# Patient Record
Sex: FEMALE | Race: OTHER
[De-identification: names, ages, dates, MRNs, and addresses within clinical notes are randomized per-mention and may not be internally consistent; named-entity substitution may affect disease eponyms.]

---

## 2021-05-25 ENCOUNTER — HOSPITAL ENCOUNTER (EMERGENCY)
Dept: HOSPITAL 15 - ER | Age: 36
LOS: 1 days | Discharge: HOME | End: 2021-05-26
Payer: MEDICARE

## 2021-05-25 VITALS — HEIGHT: 67 IN | WEIGHT: 160 LBS | BODY MASS INDEX: 25.11 KG/M2

## 2021-05-25 DIAGNOSIS — F19.10: Primary | ICD-10-CM

## 2021-05-25 DIAGNOSIS — T65.91XA: ICD-10-CM
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
## 2021-05-25 DIAGNOSIS — F11.10: ICD-10-CM

## 2021-05-25 LAB
HCT VFR BLD AUTO: 33.5 % (ref 36–46)
HGB BLD-MCNC: 11 G/DL (ref 12.2–16.2)
MCH RBC QN AUTO: 30 PG (ref 28–32)
MCV RBC AUTO: 91.5 FL (ref 80–100)
NRBC BLD QL AUTO: 0.1 %

## 2021-05-25 PROCEDURE — 93005 ELECTROCARDIOGRAM TRACING: CPT

## 2021-05-25 PROCEDURE — 80329 ANALGESICS NON-OPIOID 1 OR 2: CPT

## 2021-05-25 PROCEDURE — 85025 COMPLETE CBC W/AUTO DIFF WBC: CPT

## 2021-05-25 PROCEDURE — 36415 COLL VENOUS BLD VENIPUNCTURE: CPT

## 2021-05-25 PROCEDURE — 80053 COMPREHEN METABOLIC PANEL: CPT

## 2021-05-25 PROCEDURE — 80307 DRUG TEST PRSMV CHEM ANLYZR: CPT

## 2021-05-25 PROCEDURE — 81001 URINALYSIS AUTO W/SCOPE: CPT

## 2021-05-26 VITALS — SYSTOLIC BLOOD PRESSURE: 122 MMHG | DIASTOLIC BLOOD PRESSURE: 71 MMHG

## 2021-05-26 LAB
ALBUMIN SERPL-MCNC: 3.6 G/DL (ref 3.4–5)
ALCOHOL, URINE: < 3 MG/DL (ref 0–10)
ALP SERPL-CCNC: 68 U/L (ref 45–117)
ALT SERPL-CCNC: 25 U/L (ref 13–56)
AMPHETAMINES UR QL SCN: NEGATIVE
ANION GAP SERPL CALCULATED.3IONS-SCNC: 7 MMOL/L (ref 5–15)
APAP SERPL-MCNC: 7.8 UG/ML (ref 10–30)
BARBITURATES UR QL SCN: NEGATIVE
BENZODIAZ UR QL SCN: POSITIVE
BILIRUB SERPL-MCNC: 0.1 MG/DL (ref 0.2–1)
BUN SERPL-MCNC: 14 MG/DL (ref 7–18)
BUN/CREAT SERPL: 28.6
BZE UR QL SCN: NEGATIVE
CALCIUM SERPL-MCNC: 7.7 MG/DL (ref 8.5–10.1)
CANNABINOIDS UR QL SCN: NEGATIVE
CHLORIDE SERPL-SCNC: 108 MMOL/L (ref 98–107)
CO2 SERPL-SCNC: 24 MMOL/L (ref 21–32)
GLUCOSE SERPL-MCNC: 92 MG/DL (ref 74–106)
OPIATES UR QL SCN: NEGATIVE
PCP UR QL SCN: NEGATIVE
POTASSIUM SERPL-SCNC: 4.1 MMOL/L (ref 3.5–5.1)
PROT SERPL-MCNC: 6.9 G/DL (ref 6.4–8.2)
SALICYLATES SERPL-MCNC: < 1.7 MG/DL (ref 2.8–20)
SODIUM SERPL-SCNC: 139 MMOL/L (ref 136–145)

## 2025-01-20 ENCOUNTER — HOSPITAL ENCOUNTER (INPATIENT)
Dept: HOSPITAL 15 - ER | Age: 40
LOS: 2 days | Discharge: HOME | DRG: 552 | End: 2025-01-22
Attending: FAMILY MEDICINE | Admitting: FAMILY MEDICINE
Payer: MEDICARE

## 2025-01-20 VITALS — WEIGHT: 153.66 LBS | HEIGHT: 65 IN | BODY MASS INDEX: 25.6 KG/M2

## 2025-01-20 VITALS
DIASTOLIC BLOOD PRESSURE: 60 MMHG | OXYGEN SATURATION: 98 % | RESPIRATION RATE: 18 BRPM | HEART RATE: 70 BPM | TEMPERATURE: 97.5 F | SYSTOLIC BLOOD PRESSURE: 104 MMHG

## 2025-01-20 VITALS
OXYGEN SATURATION: 100 % | DIASTOLIC BLOOD PRESSURE: 63 MMHG | HEART RATE: 86 BPM | SYSTOLIC BLOOD PRESSURE: 109 MMHG | TEMPERATURE: 97.7 F | RESPIRATION RATE: 20 BRPM

## 2025-01-20 VITALS — RESPIRATION RATE: 12 BRPM | OXYGEN SATURATION: 97 %

## 2025-01-20 VITALS
SYSTOLIC BLOOD PRESSURE: 109 MMHG | HEART RATE: 73 BPM | RESPIRATION RATE: 18 BRPM | OXYGEN SATURATION: 97 % | DIASTOLIC BLOOD PRESSURE: 63 MMHG | TEMPERATURE: 97.7 F

## 2025-01-20 VITALS — HEART RATE: 78 BPM | OXYGEN SATURATION: 100 % | RESPIRATION RATE: 18 BRPM

## 2025-01-20 DIAGNOSIS — N39.0: ICD-10-CM

## 2025-01-20 DIAGNOSIS — F32.A: ICD-10-CM

## 2025-01-20 DIAGNOSIS — Z98.2: ICD-10-CM

## 2025-01-20 DIAGNOSIS — Z79.899: ICD-10-CM

## 2025-01-20 DIAGNOSIS — M54.16: Primary | ICD-10-CM

## 2025-01-20 DIAGNOSIS — Z98.1: ICD-10-CM

## 2025-01-20 DIAGNOSIS — Z88.5: ICD-10-CM

## 2025-01-20 DIAGNOSIS — G89.29: ICD-10-CM

## 2025-01-20 DIAGNOSIS — F11.10: ICD-10-CM

## 2025-01-20 DIAGNOSIS — F41.9: ICD-10-CM

## 2025-01-20 LAB
ANION GAP SERPL CALCULATED.3IONS-SCNC: 11 MMOL/L (ref 5–15)
BUN SERPL-MCNC: 14 MG/DL (ref 9–23)
BUN/CREAT SERPL: 21.5 (ref 10–20)
CALCIUM SERPL-MCNC: 10.2 MG/DL (ref 8.7–10.4)
CHLORIDE SERPL-SCNC: 112 MMOL/L (ref 98–107)
CO2 SERPL-SCNC: 20 MMOL/L (ref 20–31)
GLUCOSE SERPL-MCNC: 107 MG/DL (ref 74–106)
HCT VFR BLD AUTO: 39.7 % (ref 36–46)
HGB BLD-MCNC: 12.2 G/DL (ref 12.2–16.2)
MCH RBC QN AUTO: 30.6 PG (ref 28–32)
MCV RBC AUTO: 99.6 FL (ref 80–100)
NRBC BLD QL AUTO: 0.1 %
POTASSIUM SERPL-SCNC: 4.3 MMOL/L (ref 3.5–5.1)
SODIUM SERPL-SCNC: 143 MMOL/L (ref 136–145)

## 2025-01-20 PROCEDURE — 82962 GLUCOSE BLOOD TEST: CPT

## 2025-01-20 PROCEDURE — 84702 CHORIONIC GONADOTROPIN TEST: CPT

## 2025-01-20 PROCEDURE — 36415 COLL VENOUS BLD VENIPUNCTURE: CPT

## 2025-01-20 PROCEDURE — 87081 CULTURE SCREEN ONLY: CPT

## 2025-01-20 PROCEDURE — 81001 URINALYSIS AUTO W/SCOPE: CPT

## 2025-01-20 PROCEDURE — 72131 CT LUMBAR SPINE W/O DYE: CPT

## 2025-01-20 PROCEDURE — 80048 BASIC METABOLIC PNL TOTAL CA: CPT

## 2025-01-20 PROCEDURE — 72100 X-RAY EXAM L-S SPINE 2/3 VWS: CPT

## 2025-01-20 PROCEDURE — 96361 HYDRATE IV INFUSION ADD-ON: CPT

## 2025-01-20 PROCEDURE — 80053 COMPREHEN METABOLIC PANEL: CPT

## 2025-01-20 PROCEDURE — 96374 THER/PROPH/DIAG INJ IV PUSH: CPT

## 2025-01-20 PROCEDURE — 85025 COMPLETE CBC W/AUTO DIFF WBC: CPT

## 2025-01-20 RX ADMIN — HYDROMORPHONE HYDROCHLORIDE PRN MG: 2 INJECTION, SOLUTION INTRAMUSCULAR; INTRAVENOUS; SUBCUTANEOUS at 14:52

## 2025-01-20 RX ADMIN — HYDROCODONE BITARTRATE AND ACETAMINOPHEN PRN TAB: 5; 325 TABLET ORAL at 11:13

## 2025-01-20 RX ADMIN — DIPHENHYDRAMINE HYDROCHLORIDE PRN MG: 50 INJECTION INTRAMUSCULAR; INTRAVENOUS at 16:17

## 2025-01-20 RX ADMIN — ONDANSETRON HYDROCHLORIDE PRN MG: 2 INJECTION, SOLUTION INTRAMUSCULAR; INTRAVENOUS at 23:54

## 2025-01-20 RX ADMIN — ACETAMINOPHEN ONE MG: 325 TABLET ORAL at 05:38

## 2025-01-20 RX ADMIN — GABAPENTIN ONE MG: 400 CAPSULE ORAL at 03:30

## 2025-01-20 RX ADMIN — SODIUM CHLORIDE SCH ML: 9 INJECTION INTRAMUSCULAR; INTRAVENOUS; SUBCUTANEOUS at 13:33

## 2025-01-20 RX ADMIN — SODIUM CHLORIDE ONE MLS/HR: 0.9 INJECTION, SOLUTION INTRAVENOUS at 05:38

## 2025-01-20 RX ADMIN — KETOROLAC TROMETHAMINE ONE MG: 30 INJECTION, SOLUTION INTRAMUSCULAR; INTRAVENOUS at 05:40
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
## 2025-01-20 NOTE — DVHHP2
History of Present Illness


Reason for Visit:  Lumbar radiculopathy


History of Present Illness


The patient is a 39-year-old female with multiple past history including UTIs, 

anxiety, and pituitary tumor presented to [facility] ED with 

complaint of lower back pain. Patient reports symptoms progressively get worse 

with radiating buttock pain, bilateral leg pain, rating pain 10/10 numeric 

scale, sharp in nature, getting worse that prompted this visit. Patient was seen

and evaluated in the ED, laboratory data shows WBC 8.0, platelets 271, sodium 

143, potassium 4.3, BUN 14, creatinine 0.65, , glucose 107, calcium 10.2.

Lumbar spine x-ray revealing multilevel postsurgical changes, no acute fracture,

no abdominal alignment, L3 vertebral augmentation. Please see medication orders 

section in the computer. On my assessment, patient denied chest pain, no 

headache, no dizziness, no diaphoresis, no numbness, no tingling, no shortness 

of breath, no nausea, no vomiting, no fever, no chills. No other modifying 

factor or other associated signs and symptoms noted. The patient was admitted to

the hospital for further evaluation and medical management.


Past Medical History


Anxiety, UTI'S, Lower back pain, DDD of L4-5 and L5-S1, Opiate dependent, 

Pituitary tumor, Pseudotumor cerebri


Past Surgical History


Bowel perforation, Spinal fusion, LP shunt,  shunt


Family History


Reviewed, noncontributory to the management of this case.


Past Social History


The patient lives at home, denies smoking, alcohol or illicit drugs abuse.





Review of Systems


Constitutional:  No: Fever, Chills, Sweats, Weakness, Malaise, Other


Eyes:  No: Pain, Vision change, Conjunctivae inflammation, Eyelid inflammation, 

Other, Redness


ENT:  No: Ear pain, Ear discharge, Nose pain, Nose discharge, Nose congestion, 

Mouth pain, Mouth swelling, Throat pain, Throat swelling, Other


Respiratory:  No: Cough, Dry, Shortness of breath, SOB with excertion, Wheezing,

Hemoptysis, Pleuritic Pain, Sputum, Wheezing, Other


Cardiovascular:  No: Chest Pain, Palpitations, Orthopnea, Paroxysmal Noc. 

Dyspnea, Edema, Lt Headedness, Other


Gastrointestinal:  No: Nausea, Vomiting, Abdominal Pain, Diarrhea, Constipation,

Melena, Hematochezia, Other


Genitourinary:  No Dysuria, No Frequency, No Incontinence, No Hematuria, No 

Retention, No Other


Musculoskeletal:  other (Buttocks and bilateral lower leg pain.), back pain 

(Lower); No: neck pain, shoulder pain, arm pain, hand pain, leg pain, foot pain


Skin:  No: Rash, Lesions, Jaundice, Bruising, Other


Neurological:  No: Weakness, Numbness, Incoordination, Change in speech, 

Confusion, Seizures, Other


Allergies:  


Coded Allergies:  


     Gabapentin (Verified  Allergy, Unknown, 4/21/15)


     Lorazepam (Verified  Allergy, Unknown, STS IT MAKES HER CRAZY, 2/5/15)


     Morphine (Verified  Allergy, Unknown, HIVES, 2/5/15)


     Topiramate (Verified  Allergy, Unknown, BECOMES ALTERED AND SWOLLEN, 

2/5/15)


Medications





                               Current Medications








 Medications  Dose


 Ordered  Sig/Jose


 Route  Start Time


 Stop Time Status Last Admin


Dose Admin


 


 Sodium Chloride  10 ml  Q8HR


 IV  25 14:00


     





 


 Acetaminophen/


 Hydrocodone Bitart  1 tab  Q4HP  PRN


 PO  25 09:00


     





 


 Ondansetron HCl  4 mg  Q4HP  PRN


 IV  25 09:00


     





 


 Docusate Sodium  100 mg  BIDPRN  PRN


 PO  25 09:00


     





 


 Acetaminophen  650 mg  Q6HP  PRN


 PO  25 09:00


     














Exam


Vital Signs





Vital Signs








  Date Time  Temp Pulse Resp B/P (MAP) Pulse Ox O2 Delivery O2 Flow Rate FiO2


 


25 08:00 97.6 67 16 104/66 (79) 100   





 97.6       


 


25 06:19      Room Air* 0 21








General Appearance:  Alert, Oriented X3, Cooperative, No acute distress


HEENT:  Atraumatic, PERRLA, EOMI, Mucous membr. moist/pink


Respiratory:  Clear to auscultation, Normal air movement


Cardiovascular:  Regular rate, Normal S1, Normal S2, No murmurs


Abdominal:  Normal bowel sounds, Soft, No tenderness, No hepatospenomegaly, No 

masses


Extremities:  No clubbing, No cyanosis, No edema, Normal pulses, No 

tenderness/swelling


Skin:  No rashes, No breakdown, No significant lesion


Neuro:  Normal speech, Normal tone, Sensation intact, Cranial nerves 3-12 NL, 

Reflexes 2+, Other (Bilateral leg weakness)


Psych/Mental Status:  Mental status NL, Mood NL





Labs/Xrays





                                      Labs








Test


 25


04:00 Range/Units


 


 


White Blood Count


 8.0 


 4.4-10.8


10^3/uL


 


Red Blood Count


 3.99 L


 4.0-5.20


10^6/uL


 


Hemoglobin 12.2  12.2-16.2  g/dL


 


Hematocrit 39.7  36.0-46.0  %


 


Mean Corpuscular Volume 99.6  80.0-100.0  fL


 


Mean Corpuscular Hemoglobin 30.6  28.0-32.0  pg


 


Mean Corpuscular Hemoglobin


Concent 30.7 L


 32.0-36.0  g/dL





 


Red Cell Distribution Width 18.9 H 11.8-14.3  %


 


Platelet Count


 271 


 140-450


10^3/uL


 


Mean Platelet Volume 7.4  6.9-10.8  fL


 


Neutrophils (%) (Auto) 67.0  37.0-80.0  %


 


Lymphocytes (%) (Auto) 22.3  10.0-50.0  %


 


Monocytes (%) (Auto) 3.2  0.0-12.0  %


 


Eosinophils (%) (Auto) 6.6  0.0-7.0  %


 


Basophils (%) (Auto) 0.9  0.0-2.0  %


 


Neutrophils # (Auto)


 5.3 


 1.6-8.6  10


^3/uL


 


Lymphocytes # (Auto)


 1.8 


 0.4-5.4  10


^3/uL


 


Monocytes # (Auto) 0.3  0-1.3  10 ^3/uL


 


Eosinophils # (Auto) 0.5  0-0.8  10 ^3/uL


 


Basophils # (Auto) 0.1  0-0.2  10 ^3/uL


 


Nucleated Red Blood Cells 0.1   %


 


Sodium Level 143  136-145  mmol/L


 


Potassium Level 4.3  3.5-5.1  mmol/L


 


Chloride Level 112 H   mmol/L


 


Carbon Dioxide Level 20  20-31  mmol/L


 


Anion Gap 11  5-15  


 


Blood Urea Nitrogen 14  9-23  mg/dL


 


Creatinine


 0.65 


 0.550-1.02


mg/dL


 


Glomerular Filtration Rate


Calc 114 


 >90  mL/min





 


BUN/Creatinine Ratio 21.5 H 10.0-20.0  


 


Serum Glucose 107 H   mg/dL


 


Calcium Level 10.2  8.7-10.4  mg/dL











PATIENT: CONTRERAS QUINTERO ACCT: [id number]        UNIT: [id number]


: 1985           LOC: ER                   ROOM / BED:  / 


AGE / SEX: 39 / F         ADM STATUS: REG ER        SERVICE DT: 25 [id number]


ORDERING PHYSICIAN: BRADEN MCGEE MD


PROCEDURE(s): LUMB2 - LUMBAR SPINE 3 VIEW


REASON: lower back pain


ORDER NUMBER(s): [id number], ACCESSION NUMBER(s): [id number]





INDICATION: lower back pain


COMPARISON: None


TECHNIQUE: Frontal and lateral views of the lumbar spine were obtained.





FINDINGS: 


There is posterior fusion from L2-L4 with bilateral rods and bilateral screws at

L2 and L4. There is L3 vertebral augmentation. There is posterior fusion at L5-

S1 with right-sided clyde and transpedicular screws


The lumbar vertebral alignment is normal. 


The intervertebral disc spaces are well-maintained. No significant facet 

arthropathy is noted.


No acute fracture, vertebral compression deformity or aggressive osseous 

lesions. 


The paravertebral soft tissues are grossly unremarkable. 





IMPRESSION: 


1. No acute fracture. 


2. Multilevel postsurgical changes. No abnormal alignment. L3 vertebral 

augmentation.





Assessment/Plan


Assessment/Plan


Lumbar radiculopathy


Bilateral leg weakness





Plan


1.  Admit to med surge unit


2.  Breathing treatment


3.  Pain control management


4.  Management of fluids and electrolytes


5.  Consultation for hospitalist


6.  Diagnostic tests lumbar spine x-ray


7.  DVT prophylaxis-on SCDs


8.  Repeat labs CBC, CMP in a.m.


9.  Continue with current medical management


10. Treatment plan discussed with patient and RN. Patient verbalized 

understanding.


Plan discussed with:  Patient, Other (RN)


My Orders





                          Orders - MASHA JONES DNP








Procedure Category Date Status





  Time 


 


Allergies YEYO 25 In Process





  08:47 


 


Code Status CODE 25 Transmitted





  08:47 


 


Sodium Chloride Lock PHA 25 In Process





 (Saline Lock Ns)  14:00 


 


Oxygen Per Hour RT 25 Transmitted





  08:47 


 


Hydrocodone-Acet PHA 25 In Process





5/325mg Tab (Norco  09:00 


 


Ondansetron Hcl PHA 25 In Process





 (Zofran)  09:00 


 


Docusate Sodium PHA 25 In Process





Capsule (Colace  09:00 


 


Complete Blood Count LAB 25 Verified





  04:00 


 


Comprehensive LAB 25 Verified





Metabolic Panel  04:00 


 


Cardiac DIET 25 Transmitted





Diet-2gna,Lofat,Lochol  Breakfast 


 


Condition: Serious YEYO 25 In Process





  08:47 


 


Acetaminophen Tablet PHA 25 In Process





 (Tylenol Tablet)  09:00 


 


Bedrest With Bathroom YEYO 25 In Process





Privileg  08:47 


 


Sequential [facility] 25 In Process





Compression Device   


 


Admit ADMIT 25 Transmitted





  09:51 


 


Nitroglycerin PHA 25 Transmitted





Sublingual (Ntrostat  10:00 


 


Morphine Sulfate PHA 25 Transmitted





Injection  10:00 


 


Notify Md Of Changes [facility] 25 Transmitted





From Base  09:51 


 


Emergency Dysrhythmia [facility] 25 Transmitted





Protocol  09:51 


 


Oxygen By Nasal RT 25 Transmitted





Cannula  09:51 








Problem List:  


(1) Lumbar radiculopathy


(2) Bilateral leg weakness





Date of Service:  2025


Billing Provider:  MASHA JONES DNP


Common Visit Codes:  99223-INITIAL  INP/OBS CARE (HIGH)











MASHA JONES DNP            2025 09:52

## 2025-01-20 NOTE — DVH
INDICATION: lower back pain



COMPARISON: None



TECHNIQUE: Frontal and lateral views of the lumbar spine were obtained.



FINDINGS: 



There is posterior fusion from L2-L4 with bilateral rods and bilateral screws at L2 and L4.  There is
 L3 vertebral augmentation. There is posterior fusion at L5-S1 with right-sided clyde and transpedicula
r screws



The lumbar vertebral alignment is normal. 



The intervertebral disc spaces are well-maintained. No significant facet arthropathy is noted.



No acute fracture, vertebral compression deformity or aggressive osseous lesions. 



The paravertebral soft tissues are grossly unremarkable. 



IMPRESSION: 



1. No acute fracture. 



2. Multilevel postsurgical changes. No abnormal alignment. L3 vertebral augmentation.



Electronically Signed by: Maria Guadalupe Quiñones at 01/20/2025 05:02:30 AM

## 2025-01-21 VITALS
TEMPERATURE: 97.4 F | RESPIRATION RATE: 18 BRPM | SYSTOLIC BLOOD PRESSURE: 103 MMHG | OXYGEN SATURATION: 100 % | HEART RATE: 66 BPM | DIASTOLIC BLOOD PRESSURE: 71 MMHG

## 2025-01-21 VITALS
HEART RATE: 88 BPM | RESPIRATION RATE: 20 BRPM | OXYGEN SATURATION: 97 % | TEMPERATURE: 98.4 F | SYSTOLIC BLOOD PRESSURE: 113 MMHG | DIASTOLIC BLOOD PRESSURE: 72 MMHG

## 2025-01-21 VITALS
HEART RATE: 63 BPM | RESPIRATION RATE: 19 BRPM | TEMPERATURE: 97.6 F | SYSTOLIC BLOOD PRESSURE: 113 MMHG | DIASTOLIC BLOOD PRESSURE: 74 MMHG | OXYGEN SATURATION: 100 %

## 2025-01-21 VITALS
RESPIRATION RATE: 20 BRPM | HEART RATE: 79 BPM | TEMPERATURE: 98.2 F | OXYGEN SATURATION: 94 % | DIASTOLIC BLOOD PRESSURE: 59 MMHG | SYSTOLIC BLOOD PRESSURE: 101 MMHG

## 2025-01-21 VITALS
TEMPERATURE: 97.7 F | DIASTOLIC BLOOD PRESSURE: 79 MMHG | SYSTOLIC BLOOD PRESSURE: 125 MMHG | RESPIRATION RATE: 20 BRPM | HEART RATE: 84 BPM | OXYGEN SATURATION: 98 %

## 2025-01-21 VITALS
DIASTOLIC BLOOD PRESSURE: 72 MMHG | TEMPERATURE: 97.6 F | HEART RATE: 73 BPM | OXYGEN SATURATION: 100 % | RESPIRATION RATE: 19 BRPM | SYSTOLIC BLOOD PRESSURE: 144 MMHG

## 2025-01-21 VITALS — RESPIRATION RATE: 18 BRPM

## 2025-01-21 VITALS — RESPIRATION RATE: 19 BRPM | OXYGEN SATURATION: 100 % | HEART RATE: 63 BPM

## 2025-01-21 LAB
ALBUMIN SERPL-MCNC: 4.2 G/DL (ref 3.2–4.8)
ALP SERPL-CCNC: 91 U/L (ref 46–116)
ALT SERPL-CCNC: 15 U/L (ref 7–40)
ANION GAP SERPL CALCULATED.3IONS-SCNC: 8 MMOL/L (ref 5–15)
BILIRUB SERPL-MCNC: < 0.2 MG/DL (ref 0.2–1)
BUN SERPL-MCNC: 12 MG/DL (ref 9–23)
BUN/CREAT SERPL: 19.7 (ref 10–20)
CALCIUM SERPL-MCNC: 9.5 MG/DL (ref 8.7–10.4)
CHLORIDE SERPL-SCNC: 113 MMOL/L (ref 98–107)
CO2 SERPL-SCNC: 23 MMOL/L (ref 20–31)
GLUCOSE SERPL-MCNC: 167 MG/DL (ref 74–106)
HCT VFR BLD AUTO: 31.1 % (ref 36–46)
HGB BLD-MCNC: 10.1 G/DL (ref 12.2–16.2)
MCH RBC QN AUTO: 30.9 PG (ref 28–32)
MCV RBC AUTO: 95.1 FL (ref 80–100)
NRBC BLD QL AUTO: 0.1 %
POTASSIUM SERPL-SCNC: 4.1 MMOL/L (ref 3.5–5.1)
PROT SERPL-MCNC: 6.3 G/DL (ref 5.7–8.2)
SODIUM SERPL-SCNC: 144 MMOL/L (ref 136–145)

## 2025-01-21 RX ADMIN — DIPHENHYDRAMINE HYDROCHLORIDE PRN MG: 50 INJECTION INTRAMUSCULAR; INTRAVENOUS at 11:17

## 2025-01-21 RX ADMIN — HYDROMORPHONE HYDROCHLORIDE PRN MG: 2 INJECTION, SOLUTION INTRAMUSCULAR; INTRAVENOUS; SUBCUTANEOUS at 15:28

## 2025-01-21 NOTE — DVHPN2
Reviewed:  Care Plan, H&P, Labs, Medications, Previous Orders, Radiology


Changes from previous H/P or p:  No Changes


Eyes:  No Pain, No Vision change, No Conjunctivae inflammation, No Eyelid 

inflammation, No Other, No Redness


ENT:  No Ear pain, No Ear discharge, No Nose pain, No Nose discharge, No Nose 

congestion, No Mouth pain, No Mouth swelling, No Throat pain, No Throat 

swelling, No Other


Cardiovascular:  No Chest Pain, No Palpitations, No Orthopnea, No Paroxysmal 

Noc. Dyspnea, No Edema, No Lt Headedness, No Other


Respiratory:  No Cough, No Dry, No Shortness of breath, No SOB with excertion, 

No Wheezing, No Hemoptysis, No Pleuritic Pain, No Sputum, No Other


Gastrointestinal:  No Nausea, No Vomiting, No Abdominal Pain, No Diarrhea, No 

Constipation, No Melena, No Hematochezia, No Other


Genitourinary:  No Dysuria, No Frequency, No Incontinence, No Hematuria, No 

Retention, No Other


Musculoskeletal:  other (Buttocks and bilateral lower leg pain.); No neck pain, 

No shoulder pain, No arm pain; back pain (Lower); No hand pain, No leg pain, No 

foot pain


Skin:  No Rash, No Lesions, No Jaundice, No Bruising, No Other





Objective


Vitals





Vital Signs








  Date Time  Temp Pulse Resp B/P (MAP) Pulse Ox O2 Delivery O2 Flow Rate FiO2


 


1/21/25 09:16  70 18 101/70    


 


1/21/25 08:00      Room Air* 0 21


 


1/21/25 05:00 97.4    100   





 97.4       








Intake/Output











                               Intake and Output 


 


 1/21/25





 07:00


 


Intake Total 325 ml


 


Balance 325 ml


 


 


 


Intake Oral 325 ml


 


# Voids 1








Medications





                               Current Medications








 Medications  Dose


 Ordered  Sig/Jose


 Route  Start Time


 Stop Time Status Last Admin


Dose Admin


 


 Sodium Chloride  10 ml  Q8HR


 IV  1/20/25 14:00


    1/21/25 09:27


10 ML


 


 Acetaminophen/


 Hydrocodone Bitart  1 tab  Q4HP  PRN


 PO  1/20/25 09:00


    1/21/25 02:50


1 TAB


 


 Ondansetron HCl  4 mg  Q4HP  PRN


 IV  1/20/25 09:00


    1/21/25 09:15


4 MG


 


 Docusate Sodium  100 mg  BIDPRN  PRN


 PO  1/20/25 09:00


     





 


 Acetaminophen  650 mg  Q6HP  PRN


 PO  1/20/25 09:00


     





 


 Nitroglycerin  0.4 mg  Q5MINP  PRN


 SL  1/20/25 10:00


     





 


 Diphenhydramine


 HCl  25 mg  Q4HP  PRN


 IM  1/21/25 10:45


   UNV  





 


 Hydromorphone HCl  2 mg  Q6HR  PRN


 IV  1/21/25 10:45


   UNV  














Laboratory Results


Laboratory Tests


1/21/25 05:37











Chemistry








Test


 1/21/25


05:37


 


Albumin


 4.2 g/dL


(3.2-4.8)


 


Calcium Level


 9.5 mg/dL


(8.7-10.4)


 


Total Protein


 6.3 g/dL


(5.7-8.2)








LFT








Test


 1/21/25


05:37


 


Alanine Aminotransferase (ALT) 15 U/L (7-40)  


 


Alkaline Phosphatase


 91 U/L


()


 


Aspartate Amino Transferase


(AST) 17 U/L (13-40)





 


Total Bilirubin


 < 0.2 mg/dL


(0.2-1.0)  L








Labs and/or images reviewed:  Labs reviewed by me, Image(s) reviewed by me





Assessment/Plan


Assessment/Plan


Acute exacerbation of severe low back pain with a lumbar radiculopathy: Dilaudid

2 mg IV q.6 hours, Benadryl 25 mg intramuscular q.6 hours


CT LS spine ordered


History of lumbar spine surgery


History of bowel perforation


History of spinal fusion


History of  shunt 


Depression: Tele psych consult


Anxiety: Xanax


History of pseudotumor cerebri


Chronic current narcotic abuser


 Duyen. Charge Nurse Pao Chavira at  bedside


Time Spent 50 minutes 


Advanced care planning time 20 minutes 


Patient is full code


Plan discussed with:  Patient


My Orders





                            Orders - WASHINGTON BREWSTER MD








Procedure Category Date Status





  Time 


 


Diphenhdramine PHA 1/21/25 Logged





Injection (Benadryl  10:45 


 


Hydromorphone PHA 1/21/25 Logged





Injection (Dilaudid  10:45 


 


Ls Spine Wo Contrast CT 1/21/25 Logged





  10:33 


 


Beta Hcg, Quantitative LAB 1/21/25 Transmitted





  10:34 


 


*Tele Psych Consult CONS 1/21/25 Verified





  10:35 


 


Alprazolam Tablet PHA 1/21/25 Verified





 (Xanax Tablet)  10:45 











Date of Service:  Jan 21, 2025


Billing Provider:  WASHINGTON BREWSTER MD


Common Visit Codes:  38245-[id number] INP/OBS CARE(HIGH)


Secondary Visit Codes:  99497-ADVANCED CARE PLAN 30 MINUTES











WASHINGTON BREWSTER MD                Jan 21, 2025 10:40

## 2025-01-21 NOTE — DVH
EXAM: CT LS SPINE WO CONTRAST



HISTORY: Severe lumbar radiculopathy, history of L-spine sug



COMPARISON: None



CTDIvol  12.73  mGy, DLP  460.9  mGy*cm.



TECHNIQUE: Multiple axial CT images of the spine were obtained using bone algorithm.  Axial and coron
al reformatting was done. Bone and soft tissue windows were reviewed.



FINDINGS: 



No CT evidence of definite acute fracture, spinal dislocation, or significant appearing acute subluxa
tion is seen. Post kyphoplasty changes at L3. Multilevel posterior spinal fixation hardware extending
 from L2-S1. Anterior lumbar spinal fixation hardware and disc spacer material at L5-S1. 



The visualized paraspinal soft tissues are grossly unremarkable. 



Suggestion of mild to moderate canal stenosis at L3-L4.



IMPRESSION: 



No definite CT evidence of acute fracture or dislocation of the bony lumbar spine.



Electronically Signed by: Bolivar Olivera at 01/21/2025 12:54:24 PM

## 2025-01-22 VITALS
RESPIRATION RATE: 20 BRPM | SYSTOLIC BLOOD PRESSURE: 116 MMHG | HEART RATE: 84 BPM | OXYGEN SATURATION: 100 % | TEMPERATURE: 98.4 F | DIASTOLIC BLOOD PRESSURE: 67 MMHG

## 2025-01-22 VITALS
SYSTOLIC BLOOD PRESSURE: 107 MMHG | TEMPERATURE: 98 F | OXYGEN SATURATION: 97 % | DIASTOLIC BLOOD PRESSURE: 64 MMHG | RESPIRATION RATE: 20 BRPM | HEART RATE: 81 BPM

## 2025-01-22 VITALS
RESPIRATION RATE: 16 BRPM | DIASTOLIC BLOOD PRESSURE: 60 MMHG | SYSTOLIC BLOOD PRESSURE: 115 MMHG | HEART RATE: 80 BPM | OXYGEN SATURATION: 98 % | TEMPERATURE: 98 F

## 2025-01-22 VITALS
SYSTOLIC BLOOD PRESSURE: 105 MMHG | OXYGEN SATURATION: 100 % | TEMPERATURE: 97.7 F | HEART RATE: 78 BPM | RESPIRATION RATE: 18 BRPM | DIASTOLIC BLOOD PRESSURE: 63 MMHG

## 2025-01-22 VITALS
HEART RATE: 76 BPM | OXYGEN SATURATION: 100 % | DIASTOLIC BLOOD PRESSURE: 69 MMHG | TEMPERATURE: 97.5 F | RESPIRATION RATE: 18 BRPM | SYSTOLIC BLOOD PRESSURE: 91 MMHG

## 2025-01-22 VITALS — RESPIRATION RATE: 18 BRPM | OXYGEN SATURATION: 97 % | HEART RATE: 81 BPM

## 2025-01-22 VITALS — HEART RATE: 86 BPM | SYSTOLIC BLOOD PRESSURE: 115 MMHG | RESPIRATION RATE: 16 BRPM | DIASTOLIC BLOOD PRESSURE: 65 MMHG

## 2025-01-22 RX ADMIN — DIPHENHYDRAMINE HYDROCHLORIDE ONE MG: 50 INJECTION INTRAMUSCULAR; INTRAVENOUS at 11:15

## 2025-01-22 RX ADMIN — GABAPENTIN ONE MG: 100 CAPSULE ORAL at 11:30

## 2025-01-22 RX ADMIN — GABAPENTIN SCH MG: 300 CAPSULE ORAL at 14:00

## 2025-01-22 NOTE — DVHDS2
Discharge Summary


Date of Admission


Jan 20, 2025 at 09:51





Date of Discharge:


Jan 22, 2025





Admitting Diagnosis


Exacerbation of chronic low back pain





Wounds:


None





Labs/Diagnostic Data:





                               Laboratory Results








Test


 1/22/25


00:22 1/21/25


05:37 1/20/25


18:48


 


Urine Color


 Yellow


(Yellow) 


 





 


Urine Clarity Clear (Clear)   


 


Urine pH 5.5 (5.0-9.0)   


 


Urine Specific Gravity


 1.033


(1.001-1.035) 


 





 


Urine Protein


 Negative


(Negative) 


 





 


Urine Ketones


 Trace


(Negative) 


 





 


Urine Blood


 Negative /uL


(Negative) 


 





 


Urine Nitrite


 Negative


(Negative) 


 





 


Urine Bilirubin


 Negative


(Negative) 


 





 


Urine Urobilinogen


 Normal mg/dL


(Negative) 


 





 


Urine Leukocyte Esterase


 Negative /uL


(Negative) 


 





 


Urine RBC 1 /hpf (0 - 4)   


 


Urine Microscopic WBC 1 /HPF (0-5)   


 


Urine Squamous Epithelial


Cells Few /hpf (<5) 


 


 





 


Urine Calcium Oxalate Crystals


 Few (None


Seen) 


 





 


Urine Bacteria


 None seen /hpf


(None Seen) 


 





 


Urine Mucus


 Few (None


Seen) 


 





 


Urine Glucose


 Normal mg/dL


(Normal) 


 





 


White Blood Count


 


 5.8 10^3/uL


(4.4-10.8) 





 


Red Blood Count


 


 3.27 10^6/uL


(4.0-5.20) 





 


Hemoglobin


 


 10.1 g/dL


(12.2-16.2) 





 


Hematocrit


 


 31.1 %


(36.0-46.0) 





 


Mean Corpuscular Volume


 


 95.1 fL


(80.0-100.0) 





 


Mean Corpuscular Hemoglobin


 


 30.9 pg


(28.0-32.0) 





 


Mean Corpuscular Hemoglobin


Concent 


 32.5 g/dL


(32.0-36.0) 





 


Red Cell Distribution Width


 


 17.9 %


(11.8-14.3) 





 


Platelet Count


 


 303 10^3/uL


(140-450) 





 


Mean Platelet Volume


 


 7.7 fL


(6.9-10.8) 





 


Neutrophils (%) (Auto)


 


 50.8 %


(37.0-80.0) 





 


Lymphocytes (%) (Auto)


 


 32.6 %


(10.0-50.0) 





 


Monocytes (%) (Auto)


 


 4.0 %


(0.0-12.0) 





 


Eosinophils (%) (Auto)


 


 11.8 %


(0.0-7.0) 





 


Basophils (%) (Auto)


 


 0.8 %


(0.0-2.0) 





 


Neutrophils # (Auto)


 


 2.9 10 ^3/uL


(1.6-8.6) 





 


Lymphocytes # (Auto)


 


 1.9 10 ^3/uL


(0.4-5.4) 





 


Monocytes # (Auto)


 


 0.2 10 ^3/uL


(0-1.3) 





 


Eosinophils # (Auto)


 


 0.7 10 ^3/uL


(0-0.8) 





 


Basophils # (Auto)


 


 0 10 ^3/uL


(0-0.2) 





 


Nucleated Red Blood Cells  0.1 %  


 


Sodium Level


 


 144 mmol/L


(136-145) 





 


Potassium Level


 


 4.1 mmol/L


(3.5-5.1) 





 


Chloride Level


 


 113 mmol/L


() 





 


Carbon Dioxide Level


 


 23 mmol/L


(20-31) 





 


Anion Gap  8 (5-15)  


 


Blood Urea Nitrogen


 


 12 mg/dL


(9-23) 





 


Creatinine


 


 0.61 mg/dL


(0.550-1.02) 





 


Glomerular Filtration Rate


Calc 


 116 mL/min


(>90) 





 


BUN/Creatinine Ratio


 


 19.7


(10.0-20.0) 





 


Serum Glucose


 


 167 mg/dL


() 





 


Calcium Level


 


 9.5 mg/dL


(8.7-10.4) 





 


Total Bilirubin


 


 < 0.2 mg/dL


(0.2-1.0) 





 


Aspartate Amino Transferase


(AST) 


 17 U/L (13-40) 


 





 


Alanine Aminotransferase (ALT)  15 U/L (7-40)  


 


Alkaline Phosphatase


 


 91 U/L


() 





 


Total Protein


 


 6.3 g/dL


(5.7-8.2) 





 


Albumin


 


 4.2 g/dL


(3.2-4.8) 





 


Beta HCG, Quantitative


 


 0.5 mIU/mL


(1.5-4.2) 





 


POC Glucose


 


 


 132 mg/dl


()





                             Other Laboratory Tests


1/21/25 05:37











Brief Hx & Hospital Course:


39-year-old female with a history of chronic low back pain status post lumbar 

spine surgery spinal fusion history of pseudotumor cerebri status post  shunt 

on pain medications and anxiety medications came in complaining of exacerbation 

of the lumbar pain radiating down the legs.  LS spine x-ray was negative CT LS 

spine was negative patient was treated with Dilaudid and Benadryl for her 

request.  The patient was also placed on medications for anxiety tele psych 

consult was done for her ongoing depression but the patient refused and wants to

be discharged home.  Explained to the patient about negative findings with the 

CT of the LS spine she is ambulating well.  Not in distress stable vital signs 

discharged home on medications gabapentin Cymbalta oxycodone Xanax and Cipro.  

She was advised to follow up with the spine surgeon and psychiatrist at 

[facility]





Consults/Reason for consult


None





Operations or Procedures


X-ray LS spine 


CT LS spine





Condition at Discharge:


Fair





Final Diagnosis/Problems List





Acute exacerbation of severe low back pain with  lumbar radiculopathy:  LS 


spine negative for any fracture, CT LS spine is also negative for any 


fracture, continue Dilaudid 2 mg IV q.6 hours, Benadryl 25 mg 


intramuscular q.6 hours per patient's request





History of lumbar spine surgery





History of bowel perforation





History of spinal fusion





History of  shunt 





Depression: Tele psych consult





Anxiety: Xanax





History of pseudotumor cerebri





Chronic current narcotic abuser





Discharge Disposition:


Home





Discharge Instruct/Medications


Diet:  Regular


Activity:  Light activity


Follow Up/Referral:  


Follow up with your spine surgeon and psychiatrist at [facility]


Medications:  


Xanax 





Cipro





Cymbalta 





Gabapentin 





Oxycodone 





Transmitted to [address]


36 (Time Taken for discharge summary 36 minutes)


Discharge Statement:


"Patient was advised to return to the ER or call 911 if any headaches, 

dizziness, shortness of breath, chest pain, abdominal pain, bleeding, fevers, or

worsening of medical condition.





Patient was counseled about treatment plan, medications, possible side effects, 

patientverbalized understanding. All questions were answered to the best of my 

ability.





This discharge took greater then 30 minutes in planning, reviewing 

documentation, counseling the patient, and discussing with other team members."





ASSESSMENT


ASSESSMENT


Hospital Course


Improved


Assessment


Acute exacerbation of severe low back pain with  lumbar radiculopathy:  LS spine

negative for any fracture, CT LS spine is also negative for any fracture, 

continue Dilaudid 2 mg IV q.6 hours, Benadryl 25 mg intramuscular q.6 hours per 

patient's request


History of lumbar spine surgery


History of bowel perforation


History of spinal fusion


History of  shunt 


Depression: Tele psych consult


Anxiety: Xanax


History of pseudotumor cerebri


Chronic current narcotic abuser





Date of Service:  Jan 22, 2025


Billing Provider:  WASHINGTON BREWSTER MD


Common Visit Codes:  27251-GYE/OBS DISCH DAY >30min











WASHINGTON BREWSTER MD                Jan 22, 2025 10:32

## 2025-01-22 NOTE — DVHPN2
Reviewed:  Care Plan, H&P, Labs, Medications, Previous Orders, Radiology


Changes from previous H/P or p:  No Changes


Eyes:  No Pain, No Vision change, No Conjunctivae inflammation, No Eyelid 

inflammation, No Other, No Redness


ENT:  No Ear pain, No Ear discharge, No Nose pain, No Nose discharge, No Nose 

congestion, No Mouth pain, No Mouth swelling, No Throat pain, No Throat 

swelling, No Other


Cardiovascular:  No Chest Pain, No Palpitations, No Orthopnea, No Paroxysmal 

Noc. Dyspnea, No Edema, No Lt Headedness, No Other


Respiratory:  No Cough, No Dry, No Shortness of breath, No SOB with excertion, 

No Wheezing, No Hemoptysis, No Pleuritic Pain, No Sputum, No Other


Gastrointestinal:  No Nausea, No Vomiting, No Abdominal Pain, No Diarrhea, No 

Constipation, No Melena, No Hematochezia, No Other


Genitourinary:  No Dysuria, No Frequency, No Incontinence, No Hematuria, No 

Retention, No Other


Musculoskeletal:  other (Buttocks and bilateral lower leg pain.); No neck pain, 

No shoulder pain, No arm pain; back pain (Lower); No hand pain, No leg pain, No 

foot pain


Skin:  No Rash, No Lesions, No Jaundice, No Bruising, No Other





Objective


Vitals





Vital Signs








  Date Time  Temp Pulse Resp B/P (MAP) Pulse Ox O2 Delivery O2 Flow Rate FiO2


 


1/22/25 08:53 98.0 81 20 107/64 (78) 97   





 98.0       


 


1/21/25 20:00      Room Air* 0 21








Intake/Output











                               Intake and Output 


 


 1/22/25





 07:00


 


Intake Total 1595 ml


 


Balance 1595 ml


 


 


 


Intake Oral 1595 ml


 


# Voids 6








Medications





                               Current Medications








 Medications  Dose


 Ordered  Sig/Jose


 Route  Start Time


 Stop Time Status Last Admin


Dose Admin


 


 Sodium Chloride  10 ml  Q8HR


 IV  1/20/25 14:00


    1/22/25 06:20


10 ML


 


 Acetaminophen/


 Hydrocodone Bitart  1 tab  Q4HP  PRN


 PO  1/20/25 09:00


    1/22/25 00:36


1 TAB


 


 Ondansetron HCl  4 mg  Q4HP  PRN


 IV  1/20/25 09:00


    1/22/25 03:22


4 MG


 


 Docusate Sodium  100 mg  BIDPRN  PRN


 PO  1/20/25 09:00


     





 


 Acetaminophen  650 mg  Q6HP  PRN


 PO  1/20/25 09:00


     





 


 Nitroglycerin  0.4 mg  Q5MINP  PRN


 SL  1/20/25 10:00


     





 


 Diphenhydramine


 HCl  25 mg  Q4HP  PRN


 IM  1/21/25 10:45


    1/22/25 03:48


25 MG


 


 Hydromorphone HCl  2 mg  Q6HR  PRN


 IV  1/21/25 10:45


    1/22/25 03:49


2 MG


 


 Alprazolam  1 mg  Q8HPRN  PRN


 PO  1/21/25 10:45


    1/22/25 06:31


1 MG











Laboratory Results


Laboratory Tests


1/21/25 05:37











Urinalysis








Test


 1/22/25


00:22


 


Urine Color


 Yellow


(Yellow)


 


Urine Clarity Clear (Clear)  


 


Urine pH 5.5 (5.0-9.0)  


 


Urine Specific Gravity


 1.033


(1.001-1.035)


 


Urine Protein


 Negative


(Negative)


 


Urine Ketones


 Trace


(Negative)


 


Urine Blood


 Negative /uL


(Negative)


 


Urine Nitrite


 Negative


(Negative)


 


Urine Bilirubin


 Negative


(Negative)


 


Urine Urobilinogen


 Normal mg/dL


(Negative)


 


Urine Leukocyte Esterase


 Negative /uL


(Negative)


 


Urine RBC


 1 /hpf (0 - 4)





 


Urine Microscopic WBC 1 /HPF (0-5)  


 


Urine Squamous Epithelial


Cells Few /hpf (<5)  





 


Urine Calcium Oxalate Crystals


 Few (None


Seen)


 


Urine Bacteria


 None seen /hpf


(None Seen)


 


Urine Mucus


 Few (None


Seen)


 


Urine Glucose


 Normal mg/dL


(Normal)








Labs and/or images reviewed:  Labs reviewed by me, Image(s) reviewed by me





Assessment/Plan


Assessment/Plan


Acute exacerbation of severe low back pain with  lumbar radiculopathy:  LS spine

negative for any fracture, CT LS spine is also negative for any fracture, 

continue Dilaudid 2 mg IV q.6 hours, Benadryl 25 mg intramuscular q.6 hours per 

patient's request


History of lumbar spine surgery


History of bowel perforation


History of spinal fusion


History of  shunt 


Depression: Tele psych consult


Anxiety: Xanax


History of pseudotumor cerebri


Chronic current narcotic abuser


 Duyen. Charge Nurse Pao Chavira at  bedside


Time Spent 50 minutes 


Advanced care planning time 20 minutes 


Patient is full code


Plan discussed with:  Patient


My Orders





                            Orders - WASHINGTON BREWSTER MD








Procedure Category Date Status





  Time 


 


Diphenhdramine PHA 1/21/25 In Process





Injection (Benadryl  10:45 


 


Hydromorphone PHA 1/21/25 In Process





Injection (Dilaudid  10:45 


 


Ls Spine Wo Contrast CT 1/21/25 Resulted





  10:33 


 


*Tele Psych Consult CONS 1/21/25 Transmitted





  10:35 


 


Alprazolam Tablet PHA 1/21/25 In Process





 (Xanax Tablet)  10:45 


 


Gabapentin Capsule PHA 1/22/25 Transmitted





 (Neurontin Capsule)  14:00 











Date of Service:  Jan 22, 2025


Billing Provider:  WASHINGTON BREWSTER MD


Common Visit Codes:  62188-[id number] INP/OBS CARE(HIGH)











WASHINGTON BREWSTER MD                Jan 22, 2025 09:52